# Patient Record
Sex: MALE | Race: WHITE | NOT HISPANIC OR LATINO | URBAN - METROPOLITAN AREA
[De-identification: names, ages, dates, MRNs, and addresses within clinical notes are randomized per-mention and may not be internally consistent; named-entity substitution may affect disease eponyms.]

---

## 2023-07-25 ENCOUNTER — OFFICE VISIT (OUTPATIENT)
Dept: PRIMARY CARE | Facility: CLINIC | Age: 43
End: 2023-07-25
Payer: COMMERCIAL

## 2023-07-25 VITALS
SYSTOLIC BLOOD PRESSURE: 119 MMHG | DIASTOLIC BLOOD PRESSURE: 71 MMHG | TEMPERATURE: 98.1 F | RESPIRATION RATE: 18 BRPM | WEIGHT: 215 LBS | OXYGEN SATURATION: 98 % | HEART RATE: 74 BPM

## 2023-07-25 DIAGNOSIS — R00.2 PALPITATION: Primary | ICD-10-CM

## 2023-07-25 PROBLEM — M54.30 SCIATICA: Status: RESOLVED | Noted: 2023-07-25 | Resolved: 2023-07-25

## 2023-07-25 PROBLEM — M54.32 BILATERAL SCIATICA: Status: ACTIVE | Noted: 2023-07-25

## 2023-07-25 PROBLEM — R09.81 NASAL CONGESTION: Status: RESOLVED | Noted: 2023-07-25 | Resolved: 2023-07-25

## 2023-07-25 PROBLEM — M54.31 BILATERAL SCIATICA: Status: ACTIVE | Noted: 2023-07-25

## 2023-07-25 PROBLEM — L63.9 ALOPECIA AREATA: Status: ACTIVE | Noted: 2023-07-25

## 2023-07-25 PROBLEM — R53.83 FATIGUE: Status: RESOLVED | Noted: 2023-07-25 | Resolved: 2023-07-25

## 2023-07-25 PROBLEM — M54.30 SCIATICA: Status: ACTIVE | Noted: 2023-07-25

## 2023-07-25 PROBLEM — R53.83 FATIGUE: Status: ACTIVE | Noted: 2023-07-25

## 2023-07-25 PROBLEM — R09.81 NASAL CONGESTION: Status: ACTIVE | Noted: 2023-07-25

## 2023-07-25 PROBLEM — H93.19 TINNITUS: Status: ACTIVE | Noted: 2023-07-25

## 2023-07-25 PROBLEM — L63.9 ALOPECIA AREATA: Status: RESOLVED | Noted: 2023-07-25 | Resolved: 2023-07-25

## 2023-07-25 LAB
ALANINE AMINOTRANSFERASE (SGPT) (U/L) IN SER/PLAS: 18 U/L (ref 10–52)
ALBUMIN (G/DL) IN SER/PLAS: 5.2 G/DL (ref 3.4–5)
ALKALINE PHOSPHATASE (U/L) IN SER/PLAS: 66 U/L (ref 33–120)
ANION GAP IN SER/PLAS: 16 MMOL/L (ref 10–20)
ASPARTATE AMINOTRANSFERASE (SGOT) (U/L) IN SER/PLAS: 17 U/L (ref 9–39)
BILIRUBIN TOTAL (MG/DL) IN SER/PLAS: 0.6 MG/DL (ref 0–1.2)
CALCIUM (MG/DL) IN SER/PLAS: 10.2 MG/DL (ref 8.6–10.6)
CARBON DIOXIDE, TOTAL (MMOL/L) IN SER/PLAS: 22 MMOL/L (ref 21–32)
CHLORIDE (MMOL/L) IN SER/PLAS: 106 MMOL/L (ref 98–107)
CREATININE (MG/DL) IN SER/PLAS: 0.81 MG/DL (ref 0.5–1.3)
ERYTHROCYTE DISTRIBUTION WIDTH (RATIO) BY AUTOMATED COUNT: 13 % (ref 11.5–14.5)
ERYTHROCYTE MEAN CORPUSCULAR HEMOGLOBIN CONCENTRATION (G/DL) BY AUTOMATED: 33.2 G/DL (ref 32–36)
ERYTHROCYTE MEAN CORPUSCULAR VOLUME (FL) BY AUTOMATED COUNT: 91 FL (ref 80–100)
ERYTHROCYTES (10*6/UL) IN BLOOD BY AUTOMATED COUNT: 4.47 X10E12/L (ref 4.5–5.9)
GFR MALE: >90 ML/MIN/1.73M2
GLUCOSE (MG/DL) IN SER/PLAS: 108 MG/DL (ref 74–99)
HEMATOCRIT (%) IN BLOOD BY AUTOMATED COUNT: 40.7 % (ref 41–52)
HEMOGLOBIN (G/DL) IN BLOOD: 13.5 G/DL (ref 13.5–17.5)
LEUKOCYTES (10*3/UL) IN BLOOD BY AUTOMATED COUNT: 11.7 X10E9/L (ref 4.4–11.3)
NRBC (PER 100 WBCS) BY AUTOMATED COUNT: 0 /100 WBC (ref 0–0)
PLATELETS (10*3/UL) IN BLOOD AUTOMATED COUNT: 284 X10E9/L (ref 150–450)
POTASSIUM (MMOL/L) IN SER/PLAS: 4.6 MMOL/L (ref 3.5–5.3)
PROTEIN TOTAL: 7.5 G/DL (ref 6.4–8.2)
SODIUM (MMOL/L) IN SER/PLAS: 139 MMOL/L (ref 136–145)
THYROTROPIN (MIU/L) IN SER/PLAS BY DETECTION LIMIT <= 0.05 MIU/L: 0.85 MIU/L (ref 0.44–3.98)
UREA NITROGEN (MG/DL) IN SER/PLAS: 19 MG/DL (ref 6–23)

## 2023-07-25 PROCEDURE — 80053 COMPREHEN METABOLIC PANEL: CPT

## 2023-07-25 PROCEDURE — 83036 HEMOGLOBIN GLYCOSYLATED A1C: CPT

## 2023-07-25 PROCEDURE — 99214 OFFICE O/P EST MOD 30 MIN: CPT | Performed by: FAMILY MEDICINE

## 2023-07-25 PROCEDURE — 85027 COMPLETE CBC AUTOMATED: CPT

## 2023-07-25 PROCEDURE — 84443 ASSAY THYROID STIM HORMONE: CPT

## 2023-07-25 PROCEDURE — 93000 ELECTROCARDIOGRAM COMPLETE: CPT | Performed by: FAMILY MEDICINE

## 2023-07-25 PROCEDURE — 1036F TOBACCO NON-USER: CPT | Performed by: FAMILY MEDICINE

## 2023-07-25 RX ORDER — ZONISAMIDE 100 MG/1
100 CAPSULE ORAL DAILY
COMMUNITY
End: 2024-03-11 | Stop reason: WASHOUT

## 2023-07-25 RX ORDER — SERTRALINE HYDROCHLORIDE 50 MG/1
50 TABLET, FILM COATED ORAL DAILY
COMMUNITY
End: 2024-03-11 | Stop reason: WASHOUT

## 2023-07-25 RX ORDER — MINERAL OIL
180 ENEMA (ML) RECTAL DAILY
COMMUNITY

## 2023-07-25 NOTE — PROGRESS NOTES
Subjective   Patient ID: Ye Jose is a 42 y.o. male who presents for Palpitations (3 months, worse after eating).  HPI  Has been having worsening palpitations.  They are occurring multiple times per day, he states he usually does not have any other symptoms associated with them but sometimes he gets a headache when they are severe.  He states that his heart rate seems normal when he has them he just feels that pounding particular the second half of the heartbeat.  He denies any shortness of breath no lightheadedness no chest pain and no diaphoresis.  He was seen in the emergency room a few years ago at which point they did an echocardiogram and set him up with a monitor.  He states he never really received the results but was told no news is good news.    He has never seen a cardiologist for this    Non smoker  Much less caffeine  No alcohol        Review of Systems    Objective   Physical Exam  Constitutional:       Appearance: Normal appearance.   Cardiovascular:      Rate and Rhythm: Normal rate and regular rhythm.   Pulmonary:      Effort: Pulmonary effort is normal.      Breath sounds: Normal breath sounds.   Abdominal:      General: Abdomen is flat. Bowel sounds are normal.      Palpations: Abdomen is soft.   Skin:     General: Skin is warm and dry.   Neurological:      Mental Status: He is alert.   Psychiatric:         Mood and Affect: Mood normal.         Behavior: Behavior normal.         Assessment/Plan   Problem List Items Addressed This Visit       Palpitation - Primary    Relevant Orders    ECG 12 lead (Completed)    CBC    Comprehensive Metabolic Panel    Referral to Cardiology    Thyroid Stimulating Hormone    Transthoracic Echo (TTE) Complete    Fungal Culture/Smear

## 2023-07-26 LAB
ESTIMATED AVERAGE GLUCOSE FOR HBA1C: 111 MG/DL
HEMOGLOBIN A1C/HEMOGLOBIN TOTAL IN BLOOD: 5.5 %

## 2023-07-27 ENCOUNTER — APPOINTMENT (OUTPATIENT)
Dept: PRIMARY CARE | Facility: CLINIC | Age: 43
End: 2023-07-27
Payer: COMMERCIAL

## 2024-03-10 NOTE — PROGRESS NOTES
Subjective   Patient ID: Ye Jose is a 43 y.o. male who presents for Annual Exam.  Well Adult Physical   Patient here for a comprehensive physical exam.The patient reports no problems    Diet: Well balanced      Exercise: workouts 4 days per week    Social History    Tobacco Use      Smoking status: Former        Packs/day: 1.00        Years: 25.00        Additional pack years: 0.00        Total pack years: 25.00        Types: Cigarettes        Start date:         Quit date: 2023        Years since quittin.8      Smokeless tobacco: Never    Alcohol use: Not Currently    Drug use: Never        Immunization History  Administered            Date(s) Administered    Flu vaccine (IIV4), preservative free *Check age/dose*                          2020      Flu vaccine, quadrivalent, no egg protein, age 6 month or greater (FLUCELVAX)                          2019      Hep B, Unspecified    1994      Influenza, seasonal, injectable                          2021      Pfizer COVID-19 vaccine, Fall , 12 years and older, (30mcg/0.3mL)                          2024      Pfizer COVID-19 vaccine, bivalent, age 12 years and older (30 mcg/0.3 mL)                          2022      Pfizer Purple Cap SARS-CoV-2                          2021      Pneumococcal polysaccharide vaccine, 23-valent, age 2 years and older (PNEUMOVAX 23)                          08/15/2019      Tdap vaccine, age 7 year and older (BOOSTRIX, ADACEL)                          2016               Review of Systems   Constitutional:  Negative for chills and fever.   HENT:  Positive for tinnitus (has not had hearing rechecked as an adult). Negative for ear pain, sinus pressure and sore throat.    Eyes:  Negative for discharge and visual disturbance.   Respiratory:  Negative for cough and shortness of breath.    Cardiovascular:  Negative  for chest pain and palpitations.   Gastrointestinal:  Negative for abdominal distention, abdominal pain, constipation, diarrhea, nausea and vomiting.   Endocrine: Negative for cold intolerance, heat intolerance, polydipsia and polyuria.   Genitourinary:  Negative for difficulty urinating, hematuria and urgency.   Musculoskeletal:  Negative for joint swelling and myalgias.   Skin:  Negative for rash.   Allergic/Immunologic: Negative for environmental allergies and immunocompromised state.   Neurological:  Negative for dizziness, seizures and weakness.   Hematological:  Negative for adenopathy. Does not bruise/bleed easily.   Psychiatric/Behavioral:  Negative for agitation, confusion and hallucinations.        Objective   Physical Exam  Constitutional:       Appearance: Normal appearance.   HENT:      Head: Normocephalic and atraumatic.      Right Ear: Tympanic membrane normal.      Left Ear: Tympanic membrane normal.      Nose: Nose normal.      Mouth/Throat:      Mouth: Mucous membranes are moist.      Pharynx: Oropharynx is clear.   Eyes:      Conjunctiva/sclera: Conjunctivae normal.      Pupils: Pupils are equal, round, and reactive to light.   Cardiovascular:      Rate and Rhythm: Normal rate and regular rhythm.   Pulmonary:      Effort: Pulmonary effort is normal.      Breath sounds: Normal breath sounds.   Abdominal:      General: Abdomen is flat. Bowel sounds are normal.      Palpations: Abdomen is soft.   Skin:     General: Skin is warm and dry.   Neurological:      Mental Status: He is alert.   Psychiatric:         Mood and Affect: Mood normal.         Behavior: Behavior normal.         Assessment/Plan   Problem List Items Addressed This Visit       Tinnitus     Pt referred to ENT for hearing eval          Other Visit Diagnoses       Healthcare maintenance    -  Primary    Relevant Orders    Glucose, fasting    Hepatitis C antibody    HIV 1/2 Antigen/Antibody Screen with Reflex to Confirmation    Lipid panel           Follow up in 1 year for SWATHI

## 2024-03-11 ENCOUNTER — OFFICE VISIT (OUTPATIENT)
Dept: PRIMARY CARE | Facility: CLINIC | Age: 44
End: 2024-03-11
Payer: COMMERCIAL

## 2024-03-11 VITALS
DIASTOLIC BLOOD PRESSURE: 74 MMHG | BODY MASS INDEX: 30.21 KG/M2 | HEIGHT: 69 IN | HEART RATE: 68 BPM | RESPIRATION RATE: 18 BRPM | SYSTOLIC BLOOD PRESSURE: 121 MMHG | OXYGEN SATURATION: 99 % | TEMPERATURE: 98.9 F | WEIGHT: 204 LBS

## 2024-03-11 DIAGNOSIS — H93.19 TINNITUS, UNSPECIFIED LATERALITY: ICD-10-CM

## 2024-03-11 DIAGNOSIS — Z00.00 HEALTHCARE MAINTENANCE: Primary | ICD-10-CM

## 2024-03-11 PROBLEM — D48.5 NEOPLASM OF UNCERTAIN BEHAVIOR OF SKIN: Status: ACTIVE | Noted: 2020-04-10

## 2024-03-11 PROBLEM — D22.5 MELANOCYTIC NEVI OF TRUNK: Status: ACTIVE | Noted: 2020-04-10

## 2024-03-11 PROBLEM — D22.39 MELANOCYTIC NEVI OF OTHER PARTS OF FACE: Status: RESOLVED | Noted: 2020-04-10 | Resolved: 2024-03-11

## 2024-03-11 PROBLEM — D22.5 MELANOCYTIC NEVI OF TRUNK: Status: RESOLVED | Noted: 2020-04-10 | Resolved: 2024-03-11

## 2024-03-11 PROBLEM — M54.31 BILATERAL SCIATICA: Status: RESOLVED | Noted: 2023-07-25 | Resolved: 2024-03-11

## 2024-03-11 PROBLEM — M54.32 BILATERAL SCIATICA: Status: RESOLVED | Noted: 2023-07-25 | Resolved: 2024-03-11

## 2024-03-11 PROBLEM — D22.39 MELANOCYTIC NEVI OF OTHER PARTS OF FACE: Status: ACTIVE | Noted: 2020-04-10

## 2024-03-11 PROBLEM — D48.5 NEOPLASM OF UNCERTAIN BEHAVIOR OF SKIN: Status: RESOLVED | Noted: 2020-04-10 | Resolved: 2024-03-11

## 2024-03-11 PROBLEM — R00.2 PALPITATION: Status: RESOLVED | Noted: 2023-07-25 | Resolved: 2024-03-11

## 2024-03-11 PROCEDURE — 87389 HIV-1 AG W/HIV-1&-2 AB AG IA: CPT

## 2024-03-11 PROCEDURE — 1036F TOBACCO NON-USER: CPT | Performed by: FAMILY MEDICINE

## 2024-03-11 PROCEDURE — 86803 HEPATITIS C AB TEST: CPT

## 2024-03-11 PROCEDURE — 82947 ASSAY GLUCOSE BLOOD QUANT: CPT

## 2024-03-11 PROCEDURE — 36415 COLL VENOUS BLD VENIPUNCTURE: CPT

## 2024-03-11 PROCEDURE — 80061 LIPID PANEL: CPT

## 2024-03-11 PROCEDURE — 99396 PREV VISIT EST AGE 40-64: CPT | Performed by: FAMILY MEDICINE

## 2024-03-11 ASSESSMENT — ENCOUNTER SYMPTOMS
ADENOPATHY: 0
ABDOMINAL PAIN: 0
CONFUSION: 0
SORE THROAT: 0
DIFFICULTY URINATING: 0
MYALGIAS: 0
NAUSEA: 0
ABDOMINAL DISTENTION: 0
VOMITING: 0
POLYDIPSIA: 0
SHORTNESS OF BREATH: 0
PALPITATIONS: 0
SEIZURES: 0
DIARRHEA: 0
SINUS PRESSURE: 0
COUGH: 0
FEVER: 0
BRUISES/BLEEDS EASILY: 0
WEAKNESS: 0
AGITATION: 0
HEMATURIA: 0
JOINT SWELLING: 0
CHILLS: 0
EYE DISCHARGE: 0
CONSTIPATION: 0
HALLUCINATIONS: 0
DIZZINESS: 0

## 2024-03-12 LAB
CHOLEST SERPL-MCNC: 157 MG/DL (ref 0–199)
CHOLESTEROL/HDL RATIO: 3.5
GLUCOSE P FAST SERPL-MCNC: 84 MG/DL (ref 74–99)
HCV AB SER QL: NONREACTIVE
HDLC SERPL-MCNC: 44.3 MG/DL
HIV 1+2 AB+HIV1 P24 AG SERPL QL IA: NONREACTIVE
LDLC SERPL CALC-MCNC: 100 MG/DL
NON HDL CHOLESTEROL: 113 MG/DL (ref 0–149)
TRIGL SERPL-MCNC: 66 MG/DL (ref 0–149)
VLDL: 13 MG/DL (ref 0–40)

## 2025-04-22 PROBLEM — J30.9 ALLERGIC RHINITIS: Status: ACTIVE | Noted: 2025-04-22

## 2025-04-22 PROBLEM — J45.909 ASTHMA: Status: ACTIVE | Noted: 2025-04-22

## 2025-04-22 RX ORDER — LEVOCETIRIZINE DIHYDROCHLORIDE 5 MG/1
5 TABLET, FILM COATED ORAL EVERY EVENING
COMMUNITY
Start: 2025-04-16

## 2025-04-22 RX ORDER — ALBUTEROL SULFATE 90 UG/1
2 INHALANT RESPIRATORY (INHALATION) EVERY 4 HOURS PRN
COMMUNITY
Start: 2025-04-16

## 2025-04-22 RX ORDER — FLUTICASONE FUROATE AND VILANTEROL 200; 25 UG/1; UG/1
1 POWDER RESPIRATORY (INHALATION) DAILY
COMMUNITY

## 2025-04-22 RX ORDER — AZELASTINE 1 MG/ML
2 SPRAY, METERED NASAL 2 TIMES DAILY
COMMUNITY
Start: 2025-04-16

## 2025-04-26 ASSESSMENT — PROMIS GLOBAL HEALTH SCALE
CARRYOUT_PHYSICAL_ACTIVITIES: COMPLETELY
RATE_AVERAGE_PAIN: 5
RATE_SOCIAL_SATISFACTION: GOOD
RATE_AVERAGE_FATIGUE: MILD
RATE_PHYSICAL_HEALTH: VERY GOOD
EMOTIONAL_PROBLEMS: SOMETIMES
CARRYOUT_SOCIAL_ACTIVITIES: VERY GOOD
RATE_QUALITY_OF_LIFE: VERY GOOD
RATE_MENTAL_HEALTH: GOOD
RATE_GENERAL_HEALTH: VERY GOOD

## 2025-04-29 ENCOUNTER — APPOINTMENT (OUTPATIENT)
Dept: PRIMARY CARE | Facility: CLINIC | Age: 45
End: 2025-04-29
Payer: COMMERCIAL

## 2025-04-29 DIAGNOSIS — Z00.00 HEALTHCARE MAINTENANCE: Primary | ICD-10-CM

## 2025-04-29 DIAGNOSIS — M25.50 ARTHRALGIA, UNSPECIFIED JOINT: ICD-10-CM

## 2025-04-29 DIAGNOSIS — R53.83 OTHER FATIGUE: ICD-10-CM

## 2025-04-29 PROCEDURE — 99396 PREV VISIT EST AGE 40-64: CPT | Performed by: FAMILY MEDICINE

## 2025-04-29 PROCEDURE — 1036F TOBACCO NON-USER: CPT | Performed by: FAMILY MEDICINE

## 2025-04-29 ASSESSMENT — ENCOUNTER SYMPTOMS
FEVER: 0
EYE DISCHARGE: 0
SORE THROAT: 0
CHILLS: 0
POLYDIPSIA: 0
HALLUCINATIONS: 0
ADENOPATHY: 0
PALPITATIONS: 0
SINUS PRESSURE: 0
DIARRHEA: 0
SEIZURES: 0
ABDOMINAL DISTENTION: 0
JOINT SWELLING: 0
WEAKNESS: 0
AGITATION: 0
VOMITING: 0
SHORTNESS OF BREATH: 0
NAUSEA: 0
ABDOMINAL PAIN: 0
CONFUSION: 0
COUGH: 0
DIZZINESS: 0
MYALGIAS: 0
BRUISES/BLEEDS EASILY: 0
HEMATURIA: 0
DIFFICULTY URINATING: 0
CONSTIPATION: 0

## 2025-04-29 NOTE — PROGRESS NOTES
"Subjective   Patient ID: Ye Jose \"Pramod\" is a 44 y.o. male who presents for Annual Exam (Annual exam; has a few concerns).  Well Adult Physical   Patient here for a comprehensive physical exam.     Diet: Well balanced but too high in sweets    Exercise: None right now but works as a     Social History    Tobacco Use      Smoking status: Former        Packs/day: 0.00        Years: 1 pack/day for 25.3 years (25.3 ttl pk-yrs)        Types: Cigarettes        Start date:         Quit date: 2023        Years since quittin.9      Smokeless tobacco: Never    Alcohol use: Not Currently    Drug use: Never        Immunization History  Administered            Date(s) Administered    COVID-19, mRNA, LNP-S, PF, 30 mcg/0.3 mL dose                          2021      Flu vaccine (IIV4), preservative free *Check age/dose*                          2020      Flu vaccine, quadrivalent, no egg protein, age 6 month or greater (FLUCELVAX)                          2019      Hep B, Unspecified    1994      Influenza, seasonal, injectable                          2021      Pfizer COVID-19 vaccine, 12 years and older, (30mcg/0.3mL) (Comirnaty)                          2024      Pfizer COVID-19 vaccine, bivalent, age 12 years and older (30 mcg/0.3 mL)                          2022      Pneumococcal polysaccharide vaccine, 23-valent, age 2 years and older (PNEUMOVAX 23)                          08/15/2019      Tdap vaccine, age 7 year and older (BOOSTRIX, ADACEL)                          2016               More joint pain in hands, elbows, knees.  No swelling or redness.  But becoming more painful overall    Pain behind left eye, a bit worse when he puts some pressure on the front of his eye.  No nausea.  No vision changes.   No nausea vomiting.  Seems to occur throughout the day and has been getting worse " over the last 6 months    More muscle spasms in many areas of body worsening over the past year.   Includes upper back, chest and lower extremities.  Sometimes happens when he is stretching seems to spasm and will let go.        Review of Systems   Constitutional:  Negative for chills and fever.   HENT:  Negative for ear pain, sinus pressure and sore throat.    Eyes:  Negative for discharge and visual disturbance.   Respiratory:  Negative for cough and shortness of breath.    Cardiovascular:  Negative for chest pain and palpitations.   Gastrointestinal:  Negative for abdominal distention, abdominal pain, constipation, diarrhea, nausea and vomiting.   Endocrine: Negative for cold intolerance, heat intolerance, polydipsia and polyuria.   Genitourinary:  Negative for difficulty urinating, hematuria and urgency.   Musculoskeletal:  Negative for joint swelling and myalgias.   Skin:  Negative for rash.   Allergic/Immunologic: Negative for environmental allergies and immunocompromised state.   Neurological:  Negative for dizziness, seizures and weakness.   Hematological:  Negative for adenopathy. Does not bruise/bleed easily.   Psychiatric/Behavioral:  Negative for agitation, confusion and hallucinations.        Objective   Physical Exam  Constitutional:       Appearance: Normal appearance.   Cardiovascular:      Rate and Rhythm: Normal rate and regular rhythm.   Pulmonary:      Effort: Pulmonary effort is normal.      Breath sounds: Normal breath sounds.   Abdominal:      General: Abdomen is flat. Bowel sounds are normal.      Palpations: Abdomen is soft.   Skin:     General: Skin is warm and dry.   Neurological:      Mental Status: He is alert.   Psychiatric:         Mood and Affect: Mood normal.         Behavior: Behavior normal.         Assessment & Plan  Healthcare maintenance    Orders:    Thyroxine, Free; Future    Thyroid Stimulating Hormone; Future    Sedimentation Rate; Future    Comprehensive Metabolic Panel;  Future    CBC; Future    Uric Acid; Future    Rheumatoid Factor; Future    NAIDA without Reflex ADRYAN; Future    Citrulline Antibody, IgG; Future    C-Reactive Protein; Future    CT head wo IV contrast; Future    Arthralgia, unspecified joint    Orders:    Thyroxine, Free; Future    Thyroid Stimulating Hormone; Future    Sedimentation Rate; Future    Comprehensive Metabolic Panel; Future    CBC; Future    Uric Acid; Future    Rheumatoid Factor; Future    NAIDA without Reflex ADRYAN; Future    Citrulline Antibody, IgG; Future    C-Reactive Protein; Future    CT head wo IV contrast; Future    Other fatigue    Orders:    Thyroxine, Free; Future    Thyroid Stimulating Hormone; Future    Sedimentation Rate; Future    Comprehensive Metabolic Panel; Future    CBC; Future    Uric Acid; Future    Rheumatoid Factor; Future    NAIDA without Reflex ADRYAN; Future    Citrulline Antibody, IgG; Future    C-Reactive Protein; Future    CT head wo IV contrast; Future       Follow-up 1 month for muscle spasms, joint pain and left eye pain.

## 2025-05-02 LAB
ALBUMIN SERPL-MCNC: 5.1 G/DL (ref 3.6–5.1)
ALP SERPL-CCNC: 62 U/L (ref 36–130)
ALT SERPL-CCNC: 34 U/L (ref 9–46)
ANA SER QL IF: NEGATIVE
ANION GAP SERPL CALCULATED.4IONS-SCNC: 8 MMOL/L (CALC) (ref 7–17)
AST SERPL-CCNC: 23 U/L (ref 10–40)
BILIRUB SERPL-MCNC: 1 MG/DL (ref 0.2–1.2)
BUN SERPL-MCNC: 19 MG/DL (ref 7–25)
CALCIUM SERPL-MCNC: 10.1 MG/DL (ref 8.6–10.3)
CCP IGG SERPL-ACNC: <16 UNITS
CHLORIDE SERPL-SCNC: 102 MMOL/L (ref 98–110)
CO2 SERPL-SCNC: 30 MMOL/L (ref 20–32)
CREAT SERPL-MCNC: 0.65 MG/DL (ref 0.6–1.29)
CRP SERPL-MCNC: <3 MG/L
EGFRCR SERPLBLD CKD-EPI 2021: 119 ML/MIN/1.73M2
ERYTHROCYTE [DISTWIDTH] IN BLOOD BY AUTOMATED COUNT: 12.1 % (ref 11–15)
ERYTHROCYTE [SEDIMENTATION RATE] IN BLOOD BY WESTERGREN METHOD: 6 MM/H
GLUCOSE SERPL-MCNC: 87 MG/DL (ref 65–99)
HCT VFR BLD AUTO: 40.8 % (ref 38.5–50)
HGB BLD-MCNC: 13.8 G/DL (ref 13.2–17.1)
MCH RBC QN AUTO: 29.6 PG (ref 27–33)
MCHC RBC AUTO-ENTMCNC: 33.8 G/DL (ref 32–36)
MCV RBC AUTO: 87.6 FL (ref 80–100)
PLATELET # BLD AUTO: 301 THOUSAND/UL (ref 140–400)
PMV BLD REES-ECKER: 10.2 FL (ref 7.5–12.5)
POTASSIUM SERPL-SCNC: 4.5 MMOL/L (ref 3.5–5.3)
PROT SERPL-MCNC: 7.9 G/DL (ref 6.1–8.1)
RBC # BLD AUTO: 4.66 MILLION/UL (ref 4.2–5.8)
RHEUMATOID FACT SERPL-ACNC: <10 IU/ML
SODIUM SERPL-SCNC: 140 MMOL/L (ref 135–146)
T4 FREE SERPL-MCNC: 1.2 NG/DL (ref 0.8–1.8)
TSH SERPL-ACNC: 1.07 MIU/L (ref 0.4–4.5)
URATE SERPL-MCNC: 5.6 MG/DL (ref 4–8)
WBC # BLD AUTO: 9.5 THOUSAND/UL (ref 3.8–10.8)

## 2025-05-12 ENCOUNTER — HOSPITAL ENCOUNTER (OUTPATIENT)
Dept: RADIOLOGY | Facility: CLINIC | Age: 45
Discharge: HOME | End: 2025-05-12
Payer: COMMERCIAL

## 2025-05-12 DIAGNOSIS — R53.83 OTHER FATIGUE: ICD-10-CM

## 2025-05-12 DIAGNOSIS — M25.50 ARTHRALGIA, UNSPECIFIED JOINT: ICD-10-CM

## 2025-05-12 DIAGNOSIS — Z00.00 HEALTHCARE MAINTENANCE: ICD-10-CM

## 2025-05-12 PROCEDURE — 70450 CT HEAD/BRAIN W/O DYE: CPT

## 2025-05-12 PROCEDURE — 70450 CT HEAD/BRAIN W/O DYE: CPT | Performed by: RADIOLOGY

## 2025-05-13 ENCOUNTER — PATIENT MESSAGE (OUTPATIENT)
Dept: PRIMARY CARE | Facility: CLINIC | Age: 45
End: 2025-05-13
Payer: COMMERCIAL

## 2025-05-13 DIAGNOSIS — R51.9 NONINTRACTABLE HEADACHE, UNSPECIFIED CHRONICITY PATTERN, UNSPECIFIED HEADACHE TYPE: Primary | ICD-10-CM

## 2025-05-15 ENCOUNTER — OFFICE VISIT (OUTPATIENT)
Dept: NEUROLOGY | Facility: CLINIC | Age: 45
End: 2025-05-15
Payer: COMMERCIAL

## 2025-05-15 VITALS
BODY MASS INDEX: 32.29 KG/M2 | TEMPERATURE: 97.4 F | HEIGHT: 69 IN | DIASTOLIC BLOOD PRESSURE: 82 MMHG | SYSTOLIC BLOOD PRESSURE: 120 MMHG | WEIGHT: 218 LBS | HEART RATE: 81 BPM

## 2025-05-15 DIAGNOSIS — H57.12 LEFT EYE PAIN: Primary | ICD-10-CM

## 2025-05-15 DIAGNOSIS — R93.0 ABNORMAL CT OF THE HEAD: ICD-10-CM

## 2025-05-15 DIAGNOSIS — R51.9 NONINTRACTABLE HEADACHE, UNSPECIFIED CHRONICITY PATTERN, UNSPECIFIED HEADACHE TYPE: ICD-10-CM

## 2025-05-15 PROCEDURE — 99204 OFFICE O/P NEW MOD 45 MIN: CPT | Performed by: PSYCHIATRY & NEUROLOGY

## 2025-05-15 PROCEDURE — 1036F TOBACCO NON-USER: CPT | Performed by: PSYCHIATRY & NEUROLOGY

## 2025-05-15 PROCEDURE — 3008F BODY MASS INDEX DOCD: CPT | Performed by: PSYCHIATRY & NEUROLOGY

## 2025-05-15 RX ORDER — DIAZEPAM 10 MG/1
TABLET ORAL
Qty: 1 TABLET | Refills: 0 | Status: SHIPPED | OUTPATIENT
Start: 2025-05-15

## 2025-05-15 ASSESSMENT — LIFESTYLE VARIABLES
HOW OFTEN DO YOU HAVE A DRINK CONTAINING ALCOHOL: NEVER
AUDIT-C TOTAL SCORE: 0
SKIP TO QUESTIONS 9-10: 1
HOW MANY STANDARD DRINKS CONTAINING ALCOHOL DO YOU HAVE ON A TYPICAL DAY: PATIENT DOES NOT DRINK
HOW OFTEN DO YOU HAVE SIX OR MORE DRINKS ON ONE OCCASION: NEVER

## 2025-05-15 ASSESSMENT — PATIENT HEALTH QUESTIONNAIRE - PHQ9
1. LITTLE INTEREST OR PLEASURE IN DOING THINGS: NOT AT ALL
2. FEELING DOWN, DEPRESSED OR HOPELESS: NOT AT ALL
SUM OF ALL RESPONSES TO PHQ9 QUESTIONS 1 & 2: 0

## 2025-05-15 NOTE — PROGRESS NOTES
Patient is here for a follow up with his wife for results of a Brain CT scan that showed a lesion.  Patient has no symptoms just concerns.

## 2025-05-15 NOTE — PROGRESS NOTES
"Consulting Physician: Dr. Kearns    Chief Complaint: Abnormal CT Head    History Of Present Illness  Ye Jose \"Pramod\" is a 44 y.o. male presenting with abnormal CT Head.    Over the last 6 months, the patient has had episodes of left eye pain.  The pain is triggered by moving his eye, blinking, or touching his eye.  The pain is characterized by a sharp/stabbing sensation and last all day.  The episodes occurs almost everyday.  He denies any blurry vision or double vision.  He denies any slurred speech, facial droop.  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  He has chronic numbness in his hands.  He denies any muscle weakness or loss of balance.       Past Medical History  Medical History[1]    Surgical History  Surgical History[2]    Family History  Family History[3]     Social History   reports that he quit smoking about 2 years ago. His smoking use included cigarettes. He started smoking about 27 years ago. He has a 25.3 pack-year smoking history. He has never used smokeless tobacco. He reports that he does not currently use alcohol. He reports that he does not use drugs.     Allergies  Erythromycin    Medications  Current Medications[4]      Last Recorded Vitals   Blood pressure 120/82, pulse 81, temperature 36.3 °C (97.4 °F), temperature source Temporal, height 1.753 m (5' 9\"), weight 98.9 kg (218 lb).    Objective:  Gen: NAD  Neuro:  --HIF: A&O X 3, repetition and naming intact  --CN:  PERRLA, EOMI, VFF, no visible facial asymmetry, facial sensation intact, no tongue or palatal deviation, SCM intact  --Motor: Moves all 4 extremities equally; no focal deficits  --Sensory: Intact to light touch, intact to pinprick  --Reflex: 2+ symmetric, toes down  --Cerebellum: FTN and HTS intact  --Gait: Normal, narrow based.  Toe and Heal Walking Intact.  Tandem Intact    Relevant Results  Lab Results   Component Value Date    WBC 9.5 05/01/2025    HGB 13.8 05/01/2025    HCT 40.8 " "05/01/2025    MCV 87.6 05/01/2025     05/01/2025       Lab Results   Component Value Date    GLUCOSE 87 05/01/2025    CALCIUM 10.1 05/01/2025     05/01/2025    K 4.5 05/01/2025    CO2 30 05/01/2025     05/01/2025    BUN 19 05/01/2025    CREATININE 0.65 05/01/2025       Lab Results   Component Value Date    HGBA1C 5.5 07/25/2023       Lab Results   Component Value Date    CHOL 157 03/11/2024     Lab Results   Component Value Date    HDL 44.3 03/11/2024     Lab Results   Component Value Date    LDLCALC 100 (H) 03/11/2024     Lab Results   Component Value Date    TRIG 66 03/11/2024     No components found for: \"CHOLHDL\"         Assessment:  This is a 44 year old male presenting for evaluation of left eye pain.  For the past 6 months, he notes left eye pain on a daily basis.  He had a head CT which showed a calcification in the left paraclinoid region.  On exam, he has no clear focal deficits.     Ddx regarding calcified mass is a meningioma.  Ddx regarding left eye pain includes optic neuritis.    MRI Brain and Orbits with and without contrast  Ophthalmology evaluation        Robin Steward MD  [facility]  Department of Neurology      A copy of this note was sent to the referring provider.         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    History of substance abuse    Palpitation 07/25/2023    Radiculopathy, lumbar region 06/23/2014    Lumbar radiculopathy    Sciatica 07/25/2023    Spinal stenosis, cervical region 02/23/2017    Cervical stenosis of spine   [2]   Past Surgical History:  Procedure Laterality Date    ANKLE SURGERY  02/21/2014    Ankle Surgery    HERNIA REPAIR  02/21/2014    Hernia Repair    OTHER SURGICAL HISTORY  " 2014    Surgery    OTHER SURGICAL HISTORY  2015    Nerve Block Transforaminal Epidural Cervical    TONSILLECTOMY  2014    Tonsillectomy   [3]   Family History  Problem Relation Name Age of Onset    Raynaud syndrome Mother      Skin cancer Mother          no melanoma    Hyperlipidemia Father      Osteoarthritis Father      Other (htn) Father      Alopecia Brother Navdeep     Diabetes type I Maternal Grandmother      Other (Rheumatic valve) Maternal Grandfather           at 77    Coronary artery disease Paternal Grandmother  61         at 61    Other (Old age) Paternal Grandfather           at 94    Heart failure Paternal Grandfather     [4]   Current Outpatient Medications:     albuterol 90 mcg/actuation inhaler, Inhale 2 puffs every 4 hours if needed for wheezing or shortness of breath., Disp: , Rfl:     azelastine (Astelin) 137 mcg (0.1 %) nasal spray, Administer 2 sprays into each nostril 2 times a day., Disp: , Rfl:     fluticasone furoate-vilanteroL (Breo Ellipta) 200-25 mcg/dose inhaler, Inhale 1 puff once daily., Disp: , Rfl:     levocetirizine (Xyzal) 5 mg tablet, Take 1 tablet (5 mg) by mouth once daily in the evening., Disp: , Rfl:

## 2025-06-02 NOTE — PROGRESS NOTES
"Subjective   Patient ID: Ye Jose \"Pramod\" is a 44 y.o. male who presents for Follow-up.  HPI    Lots of cramps over the past year, mostly in bilateral upper extremities and right upper and mid back.   Patient states he does not have them unless he is exerting that muscle group particularly if he is using his arms, or reaching around behind him.  Denies any night cramps.    Patient was seen by neurology and has 2 MRI scheduled for tomorrow to evaluate discomfort behind his eye.    Continues to have bilateral hand pain and bilateral knee pain.  Denies any particular swelling or redness    Review of Systems    Objective   Physical Exam  Constitutional:       Appearance: Normal appearance.   Cardiovascular:      Rate and Rhythm: Normal rate and regular rhythm.   Pulmonary:      Effort: Pulmonary effort is normal.      Breath sounds: Normal breath sounds.   Skin:     General: Skin is warm and dry.   Neurological:      Mental Status: He is alert.   Psychiatric:         Mood and Affect: Mood normal.         Behavior: Behavior normal.         Assessment & Plan  Muscle cramps  Will try a course of electrolyte drink in the morning prior to work  Orders:    Follow Up In [facility] - PCP - Established; Future    Arthralgia of both hands    Orders:    XR hand left 1-2 views; Future    XR hand right 1-2 views; Future    Follow Up In [facility] - PCP - Established; Future       Follow-up in 2 months     "

## 2025-06-03 ENCOUNTER — APPOINTMENT (OUTPATIENT)
Dept: PRIMARY CARE | Facility: CLINIC | Age: 45
End: 2025-06-03
Payer: COMMERCIAL

## 2025-06-03 VITALS
DIASTOLIC BLOOD PRESSURE: 64 MMHG | WEIGHT: 217.4 LBS | HEART RATE: 70 BPM | RESPIRATION RATE: 16 BRPM | OXYGEN SATURATION: 98 % | TEMPERATURE: 97.8 F | BODY MASS INDEX: 32.1 KG/M2 | SYSTOLIC BLOOD PRESSURE: 111 MMHG

## 2025-06-03 DIAGNOSIS — R25.2 MUSCLE CRAMPS: Primary | ICD-10-CM

## 2025-06-03 DIAGNOSIS — M25.541 ARTHRALGIA OF BOTH HANDS: ICD-10-CM

## 2025-06-03 DIAGNOSIS — J45.40 MODERATE PERSISTENT ASTHMA, UNSPECIFIED WHETHER COMPLICATED (HHS-HCC): ICD-10-CM

## 2025-06-03 DIAGNOSIS — M25.542 ARTHRALGIA OF BOTH HANDS: ICD-10-CM

## 2025-06-03 PROCEDURE — 1036F TOBACCO NON-USER: CPT | Performed by: FAMILY MEDICINE

## 2025-06-03 PROCEDURE — 99213 OFFICE O/P EST LOW 20 MIN: CPT | Performed by: FAMILY MEDICINE

## 2025-06-03 ASSESSMENT — PATIENT HEALTH QUESTIONNAIRE - PHQ9
1. LITTLE INTEREST OR PLEASURE IN DOING THINGS: NOT AT ALL
2. FEELING DOWN, DEPRESSED OR HOPELESS: NOT AT ALL
SUM OF ALL RESPONSES TO PHQ9 QUESTIONS 1 AND 2: 0

## 2025-06-03 NOTE — ASSESSMENT & PLAN NOTE
Will try a course of electrolyte drink in the morning prior to work  Orders:    Follow Up In [facility] - PCP - Established; Future

## 2025-06-03 NOTE — ASSESSMENT & PLAN NOTE
Orders:    XR hand left 1-2 views; Future    XR hand right 1-2 views; Future    Follow Up In [facility] - PCP - Established; Future

## 2025-06-03 NOTE — PATIENT INSTRUCTIONS
Trial of a sugar free sport drink once a day in the morning to see if this helps decrease muscle spasms.

## 2025-06-04 ENCOUNTER — HOSPITAL ENCOUNTER (OUTPATIENT)
Dept: RADIOLOGY | Facility: CLINIC | Age: 45
Discharge: HOME | End: 2025-06-04
Payer: COMMERCIAL

## 2025-06-04 DIAGNOSIS — H57.12 LEFT EYE PAIN: ICD-10-CM

## 2025-06-04 DIAGNOSIS — R93.0 ABNORMAL CT OF THE HEAD: ICD-10-CM

## 2025-06-04 PROCEDURE — 70553 MRI BRAIN STEM W/O & W/DYE: CPT

## 2025-06-04 PROCEDURE — 2550000001 HC RX 255 CONTRASTS: Performed by: PSYCHIATRY & NEUROLOGY

## 2025-06-04 PROCEDURE — 70543 MRI ORBT/FAC/NCK W/O &W/DYE: CPT

## 2025-06-04 PROCEDURE — A9575 INJ GADOTERATE MEGLUMI 0.1ML: HCPCS | Performed by: PSYCHIATRY & NEUROLOGY

## 2025-06-04 RX ORDER — GADOTERATE MEGLUMINE 376.9 MG/ML
20 INJECTION INTRAVENOUS
Status: COMPLETED | OUTPATIENT
Start: 2025-06-04 | End: 2025-06-04

## 2025-06-04 RX ADMIN — GADOTERATE MEGLUMINE 20 ML: 376.9 INJECTION INTRAVENOUS at 10:00

## 2025-07-03 ENCOUNTER — APPOINTMENT (OUTPATIENT)
Dept: OPHTHALMOLOGY | Facility: CLINIC | Age: 45
End: 2025-07-03
Payer: COMMERCIAL

## 2025-07-03 DIAGNOSIS — H57.12 LEFT EYE PAIN: ICD-10-CM

## 2025-07-03 PROCEDURE — 99203 OFFICE O/P NEW LOW 30 MIN: CPT | Performed by: OPHTHALMOLOGY

## 2025-07-03 ASSESSMENT — CONF VISUAL FIELD
OD_INFERIOR_TEMPORAL_RESTRICTION: 0
OS_INFERIOR_NASAL_RESTRICTION: 0
OD_SUPERIOR_NASAL_RESTRICTION: 0
OD_NORMAL: 1
OS_SUPERIOR_TEMPORAL_RESTRICTION: 0
OS_INFERIOR_TEMPORAL_RESTRICTION: 0
OD_INFERIOR_NASAL_RESTRICTION: 0
OS_SUPERIOR_NASAL_RESTRICTION: 0
OD_SUPERIOR_TEMPORAL_RESTRICTION: 0
OS_NORMAL: 1

## 2025-07-03 ASSESSMENT — ENCOUNTER SYMPTOMS
MUSCULOSKELETAL NEGATIVE: 0
CONSTITUTIONAL NEGATIVE: 0
EYES NEGATIVE: 0
NEUROLOGICAL NEGATIVE: 0
ALLERGIC/IMMUNOLOGIC NEGATIVE: 0
HEMATOLOGIC/LYMPHATIC NEGATIVE: 0
RESPIRATORY NEGATIVE: 0
GASTROINTESTINAL NEGATIVE: 0
PSYCHIATRIC NEGATIVE: 0
ENDOCRINE NEGATIVE: 0
CARDIOVASCULAR NEGATIVE: 0

## 2025-07-03 ASSESSMENT — EXTERNAL EXAM - RIGHT EYE: OD_EXAM: NORMAL

## 2025-07-03 ASSESSMENT — VISUAL ACUITY
OD_SC: 20/30
METHOD: SNELLEN - LINEAR
OS_SC+: -2
OS_SC: 20/20

## 2025-07-03 ASSESSMENT — REFRACTION_MANIFEST
OD_CYLINDER: -0.50
OD_AXIS: 180
OS_SPHERE: -1.00
OD_ADD: +1.00
OS_CYLINDER: -0.75
OD_SPHERE: -1.00
OS_ADD: +1.00
OS_AXIS: 040

## 2025-07-03 ASSESSMENT — TONOMETRY
OS_IOP_MMHG: 14
OD_IOP_MMHG: 11
IOP_METHOD: TONOPEN

## 2025-07-03 ASSESSMENT — CUP TO DISC RATIO
OS_RATIO: 0.2
OD_RATIO: 0.2

## 2025-07-03 ASSESSMENT — EXTERNAL EXAM - LEFT EYE: OS_EXAM: NORMAL

## 2025-07-03 ASSESSMENT — SLIT LAMP EXAM - LIDS
COMMENTS: NORMAL
COMMENTS: NORMAL

## 2025-07-03 NOTE — PROGRESS NOTES
"Patient for left eye pain. Per patient the OS. Onset approx 2018. Had been in for routine exam and told may have had meningioma but ruled out with CT scan on 05/2025. Pain several times a week. Currently has pain with pain level \"2\" on scale. May develop the pain anytime throughout the day. When patient touches the eye he feels more pain. Vision is unchanged. Has blurred vision and CARMELO unsure. Also had MRI with WNL results. No diplopia, No light sensitivity.     No Ophthalmic finding to explain left eye pain \"tucking\", patient already had CT and MRI brain orbit which was unremarkable     Plan:  ENT referral for Sinus exam.  "

## 2025-08-04 ENCOUNTER — APPOINTMENT (OUTPATIENT)
Dept: PRIMARY CARE | Facility: CLINIC | Age: 45
End: 2025-08-04
Payer: COMMERCIAL